# Patient Record
(demographics unavailable — no encounter records)

---

## 2025-01-27 NOTE — ASSESSMENT
[FreeTextEntry1] : 63 year F with bilateral knee OA. Prior CSI with good relief CSI of bilateral knee today will get auth for gel injections, bilateral knee   Time Based billin minutes was spent with the patient today taking the patient's history, conducting a physical examination, reviewing imaging studies, and  detailed discussion regarding the diagnosis and treatment plan.

## 2025-01-27 NOTE — PROCEDURE
[FreeTextEntry3] : The risks, benefits and alternatives to the injection were discussed with the patient. The decision was made to proceed with the injection to reduce inflammation within the area. Verbal consent was obtained for the procedure. The AMAYA knee cleaned with alcohol and ethyl chloride was sprayed topically. 1cc of 40mg Kenalog and 4cc of 1% lidocaine was injected. The patient tolerated the procedure well and was instructed to ice the affected joint as needed later today. Activities can be performed as tolerated

## 2025-01-27 NOTE — HISTORY OF PRESENT ILLNESS
[de-identified] : 01/27/2025:  LEATHA WYNNE is a 63 year y/o F here for evaluation of their bilateral knee. pt states the left knee pain started 2018. right knee started dec 2024, she notes the right knee is getting worst. she takes Tylenol as needed.  pt has physical therapy for her knee which doesn't help much.  she notes doing injection as well.   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

## 2025-01-27 NOTE — IMAGING
[de-identified] : AMAYA KNEE EXAM Alignment: Varus Effusion: None Atrophy: None                                                  Stable to Varus/valgus stress Posterior Drawer Test: negative Anterior Drawer Test: Negative Knee Extension/Flexion: 0 / 120  Medial/lateral compartments Medial joint line: POS Tenderness Lateral joint line: No Tenderness Anna test: negative  Patellofemoral joint Medial patellar facet: no tenderness Patellar grind: Negative  Tendons: Pes Anserine: No tenderness Gerdys Tubercle/ IT Band: No tenderness Quadriceps Tendon: No Tenderness patellar tendon: no Tenderness Tibial tubercle: not tenderness Calf: no Tenderness  Neurovascular exam Muscle strength: 5/5 Sensation to light touch: intact Distal pulses: 2+  [id number] Xrays of the AMAYA  Knee were taken demonstrating tricompartmental arthritis with joint space collapse, osteophytes, and sclerosis

## 2025-02-10 NOTE — IMAGING
[de-identified] : AMAYA KNEE EXAM Alignment: Varus Effusion: None Atrophy: None                                                  Stable to Varus/valgus stress Posterior Drawer Test: negative Anterior Drawer Test: Negative Knee Extension/Flexion: 0 / 120  Medial/lateral compartments Medial joint line: POS Tenderness Lateral joint line: No Tenderness Anna test: negative  Patellofemoral joint Medial patellar facet: no tenderness Patellar grind: Negative  Tendons: Pes Anserine: No tenderness Gerdys Tubercle/ IT Band: No tenderness Quadriceps Tendon: No Tenderness patellar tendon: no Tenderness Tibial tubercle: not tenderness Calf: no Tenderness  Neurovascular exam Muscle strength: 5/5 Sensation to light touch: intact Distal pulses: 2+  [id number] Xrays of the AMAYA  Knee were taken demonstrating tricompartmental arthritis with joint space collapse, osteophytes, and sclerosis

## 2025-02-10 NOTE — ASSESSMENT
[FreeTextEntry1] : 63 year F with bilateral knee OA. Prior CSI with good relief CSI of bilateral knee today will get auth for gel injections, bilateral knee   02/10/2025: aperio for b/l knee Follow up once approved

## 2025-03-31 NOTE — HISTORY OF PRESENT ILLNESS
[de-identified] : 01/27/2025:  LEATHA WYNNE is a 63 year y/o F here for evaluation of their bilateral knee. pt states the left knee pain started 2018. right knee started dec 2024, she notes the right knee is getting worst. she takes Tylenol as needed.  pt has physical therapy for her knee which doesn't help much.  she notes doing injection as well.   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 she notes feeling better since last visit.   3/31/25 patient here for follow up Visco 3 # 1

## 2025-03-31 NOTE — IMAGING
[de-identified] : AMAYA KNEE EXAM Alignment: Varus Effusion: None Atrophy: None                                                  Stable to Varus/valgus stress Posterior Drawer Test: negative Anterior Drawer Test: Negative Knee Extension/Flexion: 0 / 120  Medial/lateral compartments Medial joint line: POS Tenderness Lateral joint line: No Tenderness Anna test: negative  Patellofemoral joint Medial patellar facet: no tenderness Patellar grind: Negative  Tendons: Pes Anserine: No tenderness Gerdys Tubercle/ IT Band: No tenderness Quadriceps Tendon: No Tenderness patellar tendon: no Tenderness Tibial tubercle: not tenderness Calf: no Tenderness  Neurovascular exam Muscle strength: 5/5 Sensation to light touch: intact Distal pulses: 2+  [id number] Xrays of the AMAYA  Knee were taken demonstrating tricompartmental arthritis with joint space collapse, osteophytes, and sclerosis

## 2025-03-31 NOTE — ASSESSMENT
[FreeTextEntry1] : 63 year F with bilateral knee OA. Prior CSI with good relief CSI of bilateral knee today will get auth for gel injections, bilateral knee   02/10/2025: aperio for b/l knee Follow up once approved  03/31/2025:  Visco 3 1 of 3 Follow up one week for inj #2

## 2025-03-31 NOTE — PROCEDURE
[FreeTextEntry3] : The risks, benefits and alternatives to the injection were discussed with the patient. The decision was made to proceed with the injection to reduce inflammation within the area. Verbal consent was obtained for the procedure. The bilateral knees were cleaned with alcohol and ethyl chloride was sprayed topically.  visco-3 1 of 3 was injected. The patient tolerated the procedure well and was instructed to ice the affected joint as needed later today. Activities can be performed as tolerated.    25mg/2.5ml Visco-3

## 2025-04-14 NOTE — ASSESSMENT
[FreeTextEntry1] : 63 year F with bilateral knee OA. Prior CSI with good relief CSI of bilateral knee today will get auth for gel injections, bilateral knee   02/10/2025: aperio for b/l knee Follow up once approved  03/31/2025:  Visco 3 1 of 3 Follow up one week for inj #2   04/14/2025:  Visco 3 2 of 3 Follow up one week for inj #3

## 2025-04-14 NOTE — HISTORY OF PRESENT ILLNESS
[de-identified] : 01/27/2025:  LEATHA WYNNE is a 63 year y/o F here for evaluation of their bilateral knee. pt states the left knee pain started 2018. right knee started dec 2024, she notes the right knee is getting worst. she takes Tylenol as needed.  pt has physical therapy for her knee which doesn't help much.  she notes doing injection as well.   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 she notes feeling better since last visit.   3/31/25 patient here for follow up Visco 3 # 1  04/14/2025: Patient is here today for visco-3 injection # 2

## 2025-04-14 NOTE — IMAGING
[de-identified] : AMAYA KNEE EXAM Alignment: Varus Effusion: None Atrophy: None                                                  Stable to Varus/valgus stress Posterior Drawer Test: negative Anterior Drawer Test: Negative Knee Extension/Flexion: 0 / 120  Medial/lateral compartments Medial joint line: POS Tenderness Lateral joint line: No Tenderness Anna test: negative  Patellofemoral joint Medial patellar facet: no tenderness Patellar grind: Negative  Tendons: Pes Anserine: No tenderness Gerdys Tubercle/ IT Band: No tenderness Quadriceps Tendon: No Tenderness patellar tendon: no Tenderness Tibial tubercle: not tenderness Calf: no Tenderness  Neurovascular exam Muscle strength: 5/5 Sensation to light touch: intact Distal pulses: 2+  [id number] Xrays of the AMAYA  Knee were taken demonstrating tricompartmental arthritis with joint space collapse, osteophytes, and sclerosis

## 2025-04-14 NOTE — PROCEDURE
[FreeTextEntry3] : The risks, benefits and alternatives to the injection were discussed with the patient. The decision was made to proceed with the injection to reduce inflammation within the area. Verbal consent was obtained for the procedure. The bilateral knees were cleaned with alcohol and ethyl chloride was sprayed topically.  visco-3 2 of 3 was injected. The patient tolerated the procedure well and was instructed to ice the affected joint as needed later today. Activities can be performed as tolerated.    25mg/2.5ml Visco-3

## 2025-04-21 NOTE — HISTORY OF PRESENT ILLNESS
[de-identified] : 01/27/2025:  LEATHA WYNNE is a 63 year y/o F here for evaluation of their bilateral knee. pt states the left knee pain started 2018. right knee started dec 2024, she notes the right knee is getting worst. she takes Tylenol as needed.  pt has physical therapy for her knee which doesn't help much.  she notes doing injection as well.   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 she notes feeling better since last visit.   3/31/25 patient here for follow up Visco 3 # 1  04/14/2025: Patient is here today for visco-3 injection # 2  4/21/25 pt is here for visco-3 #3 inj for both knees today.

## 2025-04-21 NOTE — PROCEDURE
[FreeTextEntry3] : The risks, benefits and alternatives to the injection were discussed with the patient. The decision was made to proceed with the injection to reduce inflammation within the area. Verbal consent was obtained for the procedure. The bilateral knees were cleaned with alcohol and ethyl chloride was sprayed topically.  visco-3 3 of 3 was injected. The patient tolerated the procedure well and was instructed to ice the affected joint as needed later today. Activities can be performed as tolerated.    25mg/2.5ml Visco-3

## 2025-04-21 NOTE — ASSESSMENT
[FreeTextEntry1] : 63 year F with bilateral knee OA. Prior CSI with good relief CSI of bilateral knee today will get auth for gel injections, bilateral knee   02/10/2025: aperio for b/l knee Follow up once approved  03/31/2025:  Visco 3 1 of 3 Follow up one week for inj #2   04/14/2025:  Visco 3 2 of 3 Follow up one week for inj #3  04/21/2025: Visco 3 3 of 3 Follow up PRN

## 2025-04-21 NOTE — IMAGING
[de-identified] : AMAYA KNEE EXAM Alignment: Varus Effusion: None Atrophy: None                                                  Stable to Varus/valgus stress Posterior Drawer Test: negative Anterior Drawer Test: Negative Knee Extension/Flexion: 0 / 120  Medial/lateral compartments Medial joint line: POS Tenderness Lateral joint line: No Tenderness Anna test: negative  Patellofemoral joint Medial patellar facet: no tenderness Patellar grind: Negative  Tendons: Pes Anserine: No tenderness Gerdys Tubercle/ IT Band: No tenderness Quadriceps Tendon: No Tenderness patellar tendon: no Tenderness Tibial tubercle: not tenderness Calf: no Tenderness  Neurovascular exam Muscle strength: 5/5 Sensation to light touch: intact Distal pulses: 2+  [id number] Xrays of the AMAYA  Knee were taken demonstrating tricompartmental arthritis with joint space collapse, osteophytes, and sclerosis

## 2025-05-30 NOTE — IMAGING
[de-identified] : AMAYA KNEE EXAM Alignment: Varus Effusion: None Atrophy: None                                                  Stable to Varus/valgus stress Posterior Drawer Test: negative Anterior Drawer Test: Negative Knee Extension/Flexion: 0 / 120  Medial/lateral compartments Medial joint line: POS Tenderness Lateral joint line: No Tenderness Anna test: negative  Patellofemoral joint Medial patellar facet: no tenderness Patellar grind: Negative  Tendons: Pes Anserine: No tenderness Gerdys Tubercle/ IT Band: No tenderness Quadriceps Tendon: No Tenderness patellar tendon: no Tenderness Tibial tubercle: not tenderness Calf: no Tenderness  Neurovascular exam Muscle strength: 5/5 Sensation to light touch: intact Distal pulses: 2+  [id number] Xrays of the AMAYA  Knee were taken demonstrating tricompartmental arthritis with joint space collapse, osteophytes, and sclerosis 2025 XR lumbar spine with anterolisthesis  hip no signs of fractures, dislocations,or significant arthritis.

## 2025-05-30 NOTE — ASSESSMENT
[FreeTextEntry1] : 63 year F with bilateral knee OA. Prior CSI with good relief CSI of bilateral knee today will get auth for gel injections, bilateral knee   02/10/2025: aperio for b/l knee Follow up once approved  03/31/2025:  Visco 3 1 of 3 Follow up one week for inj #2   04/14/2025:  Visco 3 2 of 3 Follow up one week for inj #3  04/21/2025: Visco 3 3 of 3 Follow up PRN  05/30/2025: mild relief with visco injections, Last CSI in Jan.  mobic sent to pharmacy patient will fu with foot ankle for eval left foot  PT for plantar fasciitis

## 2025-05-30 NOTE — HISTORY OF PRESENT ILLNESS
[de-identified] : 01/27/2025:  LEATHA WYNNE is a 63 year y/o F here for evaluation of their bilateral knee. pt states the left knee pain started 2018. right knee started dec 2024, she notes the right knee is getting worst. she takes Tylenol as needed.  pt has physical therapy for her knee which doesn't help much.  she notes doing injection as well.   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 she notes feeling better since last visit.   3/31/25 patient here for follow up Visco 3 # 1  04/14/2025: Patient is here today for visco-3 injection # 2  4/21/25 pt is here for visco-3 #3 inj for both knees today.  05/30/2025 Patient is Here today for follow up. states she has been having pain in the foot and stabbing feeling going up to the left knee